# Patient Record
Sex: FEMALE | Race: NATIVE HAWAIIAN OR OTHER PACIFIC ISLANDER | NOT HISPANIC OR LATINO | URBAN - METROPOLITAN AREA
[De-identification: names, ages, dates, MRNs, and addresses within clinical notes are randomized per-mention and may not be internally consistent; named-entity substitution may affect disease eponyms.]

---

## 2018-07-14 ENCOUNTER — OFFICE VISIT (OUTPATIENT)
Dept: URGENT CARE | Facility: CLINIC | Age: 23
End: 2018-07-14
Payer: COMMERCIAL

## 2018-07-14 VITALS
RESPIRATION RATE: 16 BRPM | HEART RATE: 53 BPM | DIASTOLIC BLOOD PRESSURE: 60 MMHG | OXYGEN SATURATION: 98 % | HEIGHT: 72 IN | WEIGHT: 200 LBS | SYSTOLIC BLOOD PRESSURE: 116 MMHG | BODY MASS INDEX: 27.09 KG/M2 | TEMPERATURE: 98.3 F

## 2018-07-14 DIAGNOSIS — J02.9 SORE THROAT: ICD-10-CM

## 2018-07-14 DIAGNOSIS — J02.0 STREP PHARYNGITIS: ICD-10-CM

## 2018-07-14 LAB
INT CON NEG: NEGATIVE
INT CON POS: POSITIVE
S PYO AG THROAT QL: POSITIVE

## 2018-07-14 PROCEDURE — 87880 STREP A ASSAY W/OPTIC: CPT | Performed by: NURSE PRACTITIONER

## 2018-07-14 PROCEDURE — 99204 OFFICE O/P NEW MOD 45 MIN: CPT | Performed by: NURSE PRACTITIONER

## 2018-07-14 RX ORDER — AZITHROMYCIN 250 MG/1
TABLET, FILM COATED ORAL
Qty: 6 TAB | Refills: 0 | Status: SHIPPED | OUTPATIENT
Start: 2018-07-14 | End: 2019-02-04

## 2018-07-14 ASSESSMENT — ENCOUNTER SYMPTOMS
SORE THROAT: 1
HOARSE VOICE: 1
FEVER: 0
SWOLLEN GLANDS: 1
TROUBLE SWALLOWING: 1

## 2018-07-14 NOTE — PROGRESS NOTES
Subjective:      Conor Kingston is a 23 y.o. female who presents with Sore Throat (x1-2 days)            Pharyngitis    This is a new problem. Episode onset: pt reports onset of ST 2 days ago. Hx of strep. Denies any fever. + body aches. The problem has been gradually worsening. Associated symptoms include a hoarse voice, swollen glands and trouble swallowing. She has tried acetaminophen for the symptoms. The treatment provided no relief.       Review of Systems   Constitutional: Negative for fever.   HENT: Positive for hoarse voice, sore throat and trouble swallowing.    All other systems reviewed and are negative.    History reviewed. No pertinent past medical history. History reviewed. No pertinent surgical history.   Social History     Social History   • Marital status: Single     Spouse name: N/A   • Number of children: N/A   • Years of education: N/A     Occupational History   • Not on file.     Social History Main Topics   • Smoking status: Never Smoker   • Smokeless tobacco: Never Used   • Alcohol use Yes   • Drug use: No   • Sexual activity: Yes     Other Topics Concern   • Not on file     Social History Narrative   • No narrative on file          Objective:     /60   Pulse (!) 53   Temp 36.8 °C (98.3 °F)   Resp 16   Ht 1.829 m (6')   Wt 90.7 kg (200 lb)   SpO2 98%   BMI 27.12 kg/m²      Physical Exam   Constitutional: She is oriented to person, place, and time. Vital signs are normal. She appears well-developed and well-nourished.   HENT:   Head: Normocephalic and atraumatic.   Right Ear: Tympanic membrane and external ear normal.   Left Ear: Tympanic membrane and external ear normal.   Mouth/Throat: Posterior oropharyngeal erythema present. Tonsils are 2+ on the right. Tonsils are 2+ on the left. No tonsillar exudate.   Eyes: EOM are normal. Pupils are equal, round, and reactive to light.   Neck: Normal range of motion.   Cardiovascular: Normal rate and regular rhythm.    Pulmonary/Chest:  Effort normal.   Musculoskeletal: Normal range of motion.   Lymphadenopathy:        Head (right side): Submandibular adenopathy present.        Head (left side): Submandibular adenopathy present.   Neurological: She is alert and oriented to person, place, and time.   Skin: Skin is warm and dry. Capillary refill takes less than 2 seconds.   Psychiatric: She has a normal mood and affect. Her speech is normal and behavior is normal. Thought content normal.   Vitals reviewed.              Assessment/Plan:     1. Sore throat  - POCT Rapid Strep A POSITIVE    2. Strep pharyngitis  - azithromycin (ZITHROMAX) 250 MG Tab; Take 2 tabs PO on the first day, then one tab PO daily for 4 days  Dispense: 6 Tab; Refill: 0    Warm salt water gargles  Alternate tylenol and ibuprofen as needed for pain  Soft diet, cool liquids  Supportive care, differential diagnoses, and indications for immediate follow-up discussed with patient.    Pathogenesis of diagnosis discussed including typical length and natural progression.      Instructed to return to  or nearest emergency department if symptoms fail to improve, for any change in condition, further concerns, or new concerning symptoms.  Patient states understanding of the plan of care and discharge instructions.

## 2018-12-17 ENCOUNTER — APPOINTMENT (RX ONLY)
Dept: URBAN - METROPOLITAN AREA CLINIC 22 | Facility: CLINIC | Age: 23
Setting detail: DERMATOLOGY
End: 2018-12-17

## 2018-12-17 DIAGNOSIS — L30.1 DYSHIDROSIS [POMPHOLYX]: ICD-10-CM

## 2018-12-17 PROBLEM — L30.9 DERMATITIS, UNSPECIFIED: Status: ACTIVE | Noted: 2018-12-17

## 2018-12-17 PROCEDURE — ? COUNSELING

## 2018-12-17 PROCEDURE — 99202 OFFICE O/P NEW SF 15 MIN: CPT

## 2018-12-17 PROCEDURE — ? PRESCRIPTION

## 2018-12-17 RX ORDER — BETAMETHASONE DIPROPIONATE 0.5 MG/G
OINTMENT, AUGMENTED TOPICAL
Qty: 1 | Refills: 0 | Status: ERX | COMMUNITY
Start: 2018-12-17

## 2018-12-17 RX ORDER — UREA 40 %
CREAM (GRAM) TOPICAL
Qty: 2 | Refills: 1 | Status: ERX | COMMUNITY
Start: 2018-12-17

## 2018-12-17 RX ADMIN — Medication 1: at 00:00

## 2018-12-17 RX ADMIN — BETAMETHASONE DIPROPIONATE 1: 0.5 OINTMENT, AUGMENTED TOPICAL at 00:00

## 2018-12-17 ASSESSMENT — LOCATION DETAILED DESCRIPTION DERM
LOCATION DETAILED: LEFT PLANTAR FOREFOOT OVERLYING 3RD METATARSAL
LOCATION DETAILED: RIGHT MEDIAL PLANTAR MIDFOOT
LOCATION DETAILED: LEFT THENAR EMINENCE
LOCATION DETAILED: RIGHT RADIAL PALM

## 2018-12-17 ASSESSMENT — LOCATION SIMPLE DESCRIPTION DERM
LOCATION SIMPLE: LEFT PLANTAR SURFACE
LOCATION SIMPLE: LEFT HAND
LOCATION SIMPLE: RIGHT PLANTAR SURFACE
LOCATION SIMPLE: RIGHT HAND

## 2018-12-17 ASSESSMENT — LOCATION ZONE DERM
LOCATION ZONE: FEET
LOCATION ZONE: HAND

## 2018-12-17 NOTE — PROCEDURE: COUNSELING
Detail Level: Simple
Patient Specific Counseling (Will Not Stick From Patient To Patient): If not improved will plan to biopsy at next visit

## 2019-01-15 ENCOUNTER — APPOINTMENT (RX ONLY)
Dept: URBAN - METROPOLITAN AREA CLINIC 22 | Facility: CLINIC | Age: 24
Setting detail: DERMATOLOGY
End: 2019-01-15

## 2019-01-15 DIAGNOSIS — L30.1 DYSHIDROSIS [POMPHOLYX]: ICD-10-CM | Status: IMPROVED

## 2019-01-15 PROCEDURE — 99213 OFFICE O/P EST LOW 20 MIN: CPT

## 2019-01-15 PROCEDURE — ? PRESCRIPTION

## 2019-01-15 PROCEDURE — ? ADDITIONAL NOTES

## 2019-01-15 PROCEDURE — ? COUNSELING

## 2019-01-15 RX ORDER — BETAMETHASONE DIPROPIONATE 0.5 MG/G
OINTMENT TOPICAL
Qty: 1 | Refills: 3 | Status: ERX

## 2019-01-15 RX ORDER — UREA 40 %
CREAM (GRAM) TOPICAL
Qty: 1 | Refills: 10 | Status: ERX

## 2019-01-15 ASSESSMENT — LOCATION DETAILED DESCRIPTION DERM
LOCATION DETAILED: LEFT PLANTAR FOREFOOT OVERLYING 3RD METATARSAL
LOCATION DETAILED: RIGHT RADIAL PALM
LOCATION DETAILED: LEFT THENAR EMINENCE
LOCATION DETAILED: RIGHT MEDIAL PLANTAR MIDFOOT

## 2019-01-15 ASSESSMENT — LOCATION SIMPLE DESCRIPTION DERM
LOCATION SIMPLE: LEFT PLANTAR SURFACE
LOCATION SIMPLE: RIGHT HAND
LOCATION SIMPLE: LEFT HAND
LOCATION SIMPLE: RIGHT PLANTAR SURFACE

## 2019-01-15 ASSESSMENT — LOCATION ZONE DERM
LOCATION ZONE: FEET
LOCATION ZONE: HAND

## 2019-01-15 NOTE — PROCEDURE: ADDITIONAL NOTES
Additional Notes: Continue urea to hands and feet twice  daily, betamethasone on feet 3 times a week for 2-3 weeks. When flaring use betamethasone bid 2 weeks for both hands and feet.
Detail Level: Simple

## 2019-02-04 ENCOUNTER — HOSPITAL ENCOUNTER (OUTPATIENT)
Dept: LAB | Facility: MEDICAL CENTER | Age: 24
End: 2019-02-04
Attending: FAMILY MEDICINE
Payer: COMMERCIAL

## 2019-02-04 ENCOUNTER — TELEPHONE (OUTPATIENT)
Dept: SCHEDULING | Facility: IMAGING CENTER | Age: 24
End: 2019-02-04

## 2019-02-04 ENCOUNTER — OFFICE VISIT (OUTPATIENT)
Dept: MEDICAL GROUP | Facility: LAB | Age: 24
End: 2019-02-04
Payer: COMMERCIAL

## 2019-02-04 VITALS
HEIGHT: 72 IN | SYSTOLIC BLOOD PRESSURE: 114 MMHG | OXYGEN SATURATION: 98 % | BODY MASS INDEX: 27.85 KG/M2 | HEART RATE: 88 BPM | WEIGHT: 205.6 LBS | TEMPERATURE: 98 F | DIASTOLIC BLOOD PRESSURE: 64 MMHG

## 2019-02-04 DIAGNOSIS — Z11.3 SCREENING FOR STD (SEXUALLY TRANSMITTED DISEASE): ICD-10-CM

## 2019-02-04 DIAGNOSIS — Z23 NEED FOR VACCINATION: ICD-10-CM

## 2019-02-04 DIAGNOSIS — R21 RASH: ICD-10-CM

## 2019-02-04 LAB
HIV 1+2 AB+HIV1 P24 AG SERPL QL IA: NON REACTIVE
TREPONEMA PALLIDUM IGG+IGM AB [PRESENCE] IN SERUM OR PLASMA BY IMMUNOASSAY: NON REACTIVE

## 2019-02-04 PROCEDURE — 90471 IMMUNIZATION ADMIN: CPT | Performed by: FAMILY MEDICINE

## 2019-02-04 PROCEDURE — 87389 HIV-1 AG W/HIV-1&-2 AB AG IA: CPT

## 2019-02-04 PROCEDURE — 36415 COLL VENOUS BLD VENIPUNCTURE: CPT

## 2019-02-04 PROCEDURE — 99385 PREV VISIT NEW AGE 18-39: CPT | Mod: 25 | Performed by: FAMILY MEDICINE

## 2019-02-04 PROCEDURE — 87491 CHLMYD TRACH DNA AMP PROBE: CPT

## 2019-02-04 PROCEDURE — 90651 9VHPV VACCINE 2/3 DOSE IM: CPT | Performed by: FAMILY MEDICINE

## 2019-02-04 PROCEDURE — 87591 N.GONORRHOEAE DNA AMP PROB: CPT

## 2019-02-04 PROCEDURE — 86780 TREPONEMA PALLIDUM: CPT

## 2019-02-04 ASSESSMENT — PATIENT HEALTH QUESTIONNAIRE - PHQ9: CLINICAL INTERPRETATION OF PHQ2 SCORE: 0

## 2019-02-04 NOTE — LETTER
[facility]  Pcp Pt States None  No address on file  Fax: [phone]   Authorization for Release/Disclosure of   Protected Health Information   Name: KARIN KINGSTON : 1995 SSN: [id number]   Address: [address] [place] [address] [address] [place] [address] Phone:    [phone] (home)    I authorize the entity listed below to release/disclose the PHI below to:   [facility]/Pcp Pt States None and Diana Arthur M.D.   Provider or Entity Name:  [facility]   Address   City, State, [facility]   Phone:      Fax:     Reason for request: continuity of care   Information to be released:    [  ] LAST COLONOSCOPY,  including any PATH REPORT and follow-up  [  ] LAST FIT/COLOGUARD RESULT [  ] LAST DEXA  [  ] LAST MAMMOGRAM  [X  ] LAST PAP  [  ] LAST LABS [  ] RETINA EXAM REPORT  [  ] IMMUNIZATION RECORDS  [  ] Release all info      [  ] Check here and initial the line next to each item to release ALL health information INCLUDING  _____ Care and treatment for drug and / or alcohol abuse  _____ HIV testing, infection status, or AIDS  _____ Genetic Testing    DATES OF SERVICE OR TIME PERIOD TO BE DISCLOSED: _____________  I understand and acknowledge that:  * This Authorization may be revoked at any time by you in writing, except if your health information has already been used or disclosed.  * Your health information that will be used or disclosed as a result of you signing this authorization could be re-disclosed by the recipient. If this occurs, your re-disclosed health information may no longer be protected by State or Federal laws.  * You may refuse to sign this Authorization. Your refusal will not affect your ability to obtain treatment.  * This Authorization becomes effective upon signing and will  on (date) __________.      If no date is indicated, this Authorization will  one (1) year from the signature date.    Name: Karin Kingston    Signature:   Date:     2019       PLEASE FAX REQUESTED  RECORDS BACK TO: [phone]

## 2019-02-04 NOTE — PROGRESS NOTES
Subjective:     CC:   Chief Complaint   Patient presents with   • Establish Care   • Rash       HPI:   Conor Kingston is a 23 y.o. female who presents for annual exam. She is feeling well and denies any complaints.    Patient has GYN provider: Yes  Last pap: December 2018, WNL  Last mammo: Age 50  Last colonoscopy: Age 50  Last bone density test: NA  Qualify for hep C screen:NA  Last Tdap: Will check records   Gardiasil: Will be given 2nd dose today   Hx. STD's: Age 16, Chlaymdia  Birth control: Crysel Pill     Menses every month with 14 days, heavy for 7 days, then 7 days of spotting bleeding.  Cramping is moderate   She take OTC analgesics for cramps. (Advil)  No significant bloating/fluid retention, pelvic pain, or dyspareunia. No vaginal discharge.  No breast tenderness, mass, nipple discharge, changes in size or contour, or abnormal cyclic discomfort.  She does perform regular self breast exams a couple times a week in the shower  Regular exercise: Yes, Run and Lift Weights  Diet: Tries to eat healthy but occasionally eats fast food.     She  has no past medical history on file.  She  has no past surgical history on file.    No family history on file.    Social History     Social History   • Marital status: Single     Spouse name: N/A   • Number of children: N/A   • Years of education: N/A     Occupational History   • Not on file.     Social History Main Topics   • Smoking status: Never Smoker   • Smokeless tobacco: Never Used   • Alcohol use Yes   • Drug use: No   • Sexual activity: Yes     Other Topics Concern   • Not on file     Social History Narrative   • No narrative on file       There are no active problems to display for this patient.        Current Outpatient Prescriptions   Medication Sig Dispense Refill   • etonogestrel (NEXPLANON) 68 MG Implant implant Inject 1 Each as instructed Once.     • azithromycin (ZITHROMAX) 250 MG Tab Take 2 tabs PO on the first day, then one tab PO daily for 4 days  (Patient not taking: Reported on 2/4/2019) 6 Tab 0     No current facility-administered medications for this visit.      Allergies   Allergen Reactions   • Pcn [Penicillins] Hives       Review of Systems   Constitutional: Negative for fever, chills and malaise/fatigue.   HENT: Negative for congestion. .     Eyes: Negative for pain.   Respiratory: Negative for cough and shortness of breath.    Cardiovascular: Negative for leg swelling.   Gastrointestinal: Negative for nausea, vomiting, abdominal pain and diarrhea.   Genitourinary: Negative for dysuria and hematuria.   Skin: Positive for rash on hands and feet.   Neurological: Negative for dizziness, focal weakness. Positive headaches that started 2 weeks ago.   Endo/Heme/Allergies: Does not bruise/bleed easily.   Psychiatric/Behavioral: Negative for depression.  The patient is not nervous/anxious.      Objective:     /64 (BP Location: Left arm, Patient Position: Sitting)   Pulse 88   Temp 36.7 °C (98 °F) (Temporal)   Ht 1.829 m (6')   Wt 93.3 kg (205 lb 9.6 oz)   SpO2 98%   BMI 27.88 kg/m²   Body mass index is 27.88 kg/m².  Wt Readings from Last 4 Encounters:   02/04/19 93.3 kg (205 lb 9.6 oz)   07/14/18 90.7 kg (200 lb)       Physical Exam:  Constitutional: Well-developed and well-nourished. Not diaphoretic. No distress.   Skin: Skin is warm and dry. No rash noted.  Head: Atraumatic without lesions.  Eyes: Conjunctivae and extraocular motions are normal. Pupils are equal, round, and reactive to light. No scleral icterus.   Ears:  External ears unremarkable. Tympanic membranes clear and intact.  Nose: Nares patent. Septum midline. Turbinates without erythema nor edema. No discharge.   Mouth/Throat: Dentition is good. Tongue normal. Oropharynx is clear and moist. Posterior pharynx without erythema or exudates.  Neck: Supple, trachea midline. Normal range of motion. No thyromegaly present. No lymphadenopathy--cervical or supraclavicular.  Cardiovascular:  Regular rate and rhythm, S1 and S2 without murmur, rubs, or gallops.    Abdomen: Soft, non tender, and without distention. Active bowel sounds in all four quadrants. No rebound, guarding, masses or HSM.  Extremities: No cyanosis, clubbing, erythema, nor edema. Distal pulses intact and symmetric.   Musculoskeletal: All major joints AROM full in all directions without pain.  Neurological: Alert and oriented x 3. DTRs 2+/3 and symmetric. No cranial nerve deficit. 5/5 myotomes. Sensation intact. Negative Rhomberg.  Psychiatric:  Behavior, mood, and affect are appropriate.    Assessment and Plan:     1. Rash   Started a the of 16. Seen by a dermatologist, Dr. Iraheta ([facility]). Currently has a prescription for betamethasone 0.05% and urea 40%. She has a follow up appt. in one month. Dermatologist has not yet given her a diagnosis. Rash is characterized by blistering, itching, and pain. After these symptoms resolved there is excessive dryness. The rash is localized to hand and feet. It does not spread to other locations. Prescription cream seems to be helping. Continue surveillance with derm  - REFERRAL TO DERMATOLOGY    2. Screening for STD (sexually transmitted disease)  Ordered today   - CHLAMYDIA/GC PCR URINE OR SWAB; Future  - T.PALLIDUM AB EIA; Future  - HIV AG/AB COMBO ASSAY SCREENING; Future    Rash:  HCM:  Sees dentist and eye doctor  Labs per orders  Immunizations per orders  Patient counseled about skin care, diet, supplements, vitamins, safe sex and exercise.    Follow-up: 1 year   Please note that this dictation was created using voice recognition software. I have made every reasonable attempt to correct obvious errors, but I expect that there are errors of grammar and possibly content that I did not discover before finalizing the note.

## 2019-02-05 LAB
C TRACH DNA SPEC QL NAA+PROBE: POSITIVE
N GONORRHOEA DNA SPEC QL NAA+PROBE: NEGATIVE
SPECIMEN SOURCE: ABNORMAL

## 2019-02-06 ENCOUNTER — TELEPHONE (OUTPATIENT)
Dept: MEDICAL GROUP | Facility: LAB | Age: 24
End: 2019-02-06

## 2019-02-06 RX ORDER — AZITHROMYCIN 500 MG/1
1000 TABLET, FILM COATED ORAL DAILY
Qty: 2 TAB | Refills: 0 | Status: SHIPPED | OUTPATIENT
Start: 2019-02-06

## 2019-02-06 NOTE — TELEPHONE ENCOUNTER
Phone Number Called:[phone]    Message: Received VM from lab to call back for positive results. Called back spoke to Frannie and she stated pt tested positive for chlamydia.     Left Message for patient to call back: N\A

## 2019-02-14 ENCOUNTER — TELEPHONE (OUTPATIENT)
Dept: MEDICAL GROUP | Facility: LAB | Age: 24
End: 2019-02-14

## 2021-03-16 ENCOUNTER — OFFICE (OUTPATIENT)
Dept: URBAN - METROPOLITAN AREA CLINIC 106 | Facility: CLINIC | Age: 26
End: 2021-03-16

## 2021-03-16 VITALS
DIASTOLIC BLOOD PRESSURE: 76 MMHG | SYSTOLIC BLOOD PRESSURE: 124 MMHG | HEIGHT: 72 IN | TEMPERATURE: 97.2 F | WEIGHT: 219 LBS | HEART RATE: 68 BPM

## 2021-03-16 DIAGNOSIS — R10.32 LEFT LOWER QUADRANT PAIN: ICD-10-CM

## 2021-03-16 DIAGNOSIS — R11.0 NAUSEA: ICD-10-CM

## 2021-03-16 PROCEDURE — 99204 OFFICE O/P NEW MOD 45 MIN: CPT

## 2021-03-16 RX ORDER — SODIUM SULFATE, POTASSIUM SULFATE, MAGNESIUM SULFATE 17.5; 3.13; 1.6 G/ML; G/ML; G/ML
SOLUTION, CONCENTRATE ORAL
Qty: 1 | Refills: 0 | Status: COMPLETED
Start: 2021-03-16 | End: 2022-03-15

## 2021-03-16 NOTE — SERVICEHPINOTES
Shane Garner   is seen for an initial visit today.     She presents today complaining of LLQ pain since December 2020 described as intermittent, aching and stabbing that is becoming more frequent. She states nausea and dry heaving with pain only. She presented to the ER in December for this with normal labs, pelvic ultrasound and CT. Repeat CT scan done on 2/14/21 reveals mild wall prominence of the urinary bladder, otherwise no intra-abdominal abnormality. She returned to the ER on 3/7/21 had a normal pelvic ultrasound. She denies GI tract bleeding, change in bowel habits or unintentional weight loss.

## 2021-04-12 ENCOUNTER — AMBULATORY SURGICAL CENTER (OUTPATIENT)
Dept: URBAN - METROPOLITAN AREA SURGERY 25 | Facility: SURGERY | Age: 26
End: 2021-04-12

## 2021-04-12 DIAGNOSIS — R10.32 LEFT LOWER QUADRANT PAIN: ICD-10-CM

## 2021-04-12 PROCEDURE — 45378 DIAGNOSTIC COLONOSCOPY: CPT | Performed by: INTERNAL MEDICINE

## 2022-03-16 ENCOUNTER — OFFICE (OUTPATIENT)
Dept: URBAN - METROPOLITAN AREA CLINIC 67 | Facility: CLINIC | Age: 27
End: 2022-03-16

## 2022-03-16 VITALS
DIASTOLIC BLOOD PRESSURE: 80 MMHG | HEART RATE: 79 BPM | HEIGHT: 72 IN | WEIGHT: 209 LBS | SYSTOLIC BLOOD PRESSURE: 120 MMHG

## 2022-03-16 DIAGNOSIS — K58.1 IRRITABLE BOWEL SYNDROME WITH CONSTIPATION: ICD-10-CM

## 2022-03-16 DIAGNOSIS — R10.32 LEFT LOWER QUADRANT PAIN: ICD-10-CM

## 2022-03-16 PROCEDURE — 99214 OFFICE O/P EST MOD 30 MIN: CPT

## 2022-03-16 RX ORDER — DICYCLOMINE HYDROCHLORIDE 10 MG/1
CAPSULE ORAL
Qty: 60 | Refills: 2 | Status: ACTIVE
Start: 2022-03-16